# Patient Record
Sex: FEMALE | Race: WHITE | Employment: FULL TIME | URBAN - METROPOLITAN AREA
[De-identification: names, ages, dates, MRNs, and addresses within clinical notes are randomized per-mention and may not be internally consistent; named-entity substitution may affect disease eponyms.]

---

## 2017-02-22 ENCOUNTER — TELEPHONE (OUTPATIENT)
Dept: INTERNAL MEDICINE CLINIC | Facility: CLINIC | Age: 47
End: 2017-02-22

## 2017-02-22 NOTE — TELEPHONE ENCOUNTER
To Dr Nelda Pisano came today for 3rd Hep B injection  So far she has received injections 10/5/16 and 11/8/16    I checked with Dr Marivel Saini dose should be given 6 months from 1st so patient would have been too early as today's date is 2/22/17  Patient

## 2017-02-23 NOTE — TELEPHONE ENCOUNTER
Patient called back and was notified of 's message. Patient states that she already has scheduled an appointment to get the 3rd dose on 4/12/17, ok to keep that just cannot get the vaccine earlier than 4/5/17.  Patient verbalized understanding, state

## 2017-02-23 NOTE — TELEPHONE ENCOUNTER
She should get the thirst dose 4/5/17. She can always try going to a travel clinic for the 3rd dose (we can provide her documentation of her immunizations). Please ask her what she would like to do.

## 2017-03-15 ENCOUNTER — OFFICE VISIT (OUTPATIENT)
Dept: OTOLARYNGOLOGY | Facility: CLINIC | Age: 47
End: 2017-03-15

## 2017-03-15 VITALS
DIASTOLIC BLOOD PRESSURE: 83 MMHG | SYSTOLIC BLOOD PRESSURE: 139 MMHG | BODY MASS INDEX: 33.61 KG/M2 | WEIGHT: 178 LBS | TEMPERATURE: 98 F | HEIGHT: 61 IN

## 2017-03-15 DIAGNOSIS — J01.40 SUBACUTE PANSINUSITIS: Primary | ICD-10-CM

## 2017-03-15 PROCEDURE — 99212 OFFICE O/P EST SF 10 MIN: CPT | Performed by: OTOLARYNGOLOGY

## 2017-03-15 PROCEDURE — 99203 OFFICE O/P NEW LOW 30 MIN: CPT | Performed by: OTOLARYNGOLOGY

## 2017-03-15 RX ORDER — AZITHROMYCIN 250 MG/1
TABLET, FILM COATED ORAL
Qty: 1 PACKAGE | Refills: 0 | Status: SHIPPED | OUTPATIENT
Start: 2017-03-15

## 2017-03-15 RX ORDER — HYDROCHLOROTHIAZIDE 12.5 MG/1
CAPSULE, GELATIN COATED ORAL
COMMUNITY
Start: 2017-03-09

## 2017-03-15 NOTE — PROGRESS NOTES
Stacia Norwood is a 51year old female. Patient presents with:  Sinus Problem: sinus congestion, coughing, sore throat,facial pressure for 3 weeks      HISTORY OF PRESENT ILLNESS  3/15/2017  Patient prevents for evaluation of sinusitis.  This is not recur Negative Chest pain, irregular heartbeat/palpitations and syncope. GI Negative Abdominal pain and diarrhea. Endocrine Negative Cold intolerance and heat intolerance. Neuro Negative Tremors. Psych Negative Anxiety and depression.    Integumentary Neg (Patient taking differently: 1 spray by Nasal route daily as needed.  ), Disp: 1 Bottle, Rfl: 1  •  hydrochlorothiazide 12.5 MG Oral Tab, Take 1 tablet (12.5 mg total) by mouth daily. , Disp: 90 tablet, Rfl: 3  •  Sulfacetamide Sodium, Acne, 10 % Apply Exte

## 2017-04-10 ENCOUNTER — TELEPHONE (OUTPATIENT)
Dept: INTERNAL MEDICINE CLINIC | Facility: CLINIC | Age: 47
End: 2017-04-10

## 2017-04-10 NOTE — TELEPHONE ENCOUNTER
Pt. Has questions regarding the 3rd dose of Hep B she is waiting for her [employer] ins. She wants to know if the 3rd vaccine was billed already  Ph.  # [phone]   Routed to clinical

## 2017-06-26 ENCOUNTER — NURSE ONLY (OUTPATIENT)
Dept: INTERNAL MEDICINE CLINIC | Facility: CLINIC | Age: 47
End: 2017-06-26

## 2017-06-26 DIAGNOSIS — Z23 IMMUNIZATION DUE: Primary | ICD-10-CM

## 2017-06-26 PROCEDURE — 90471 IMMUNIZATION ADMIN: CPT | Performed by: INTERNAL MEDICINE

## 2017-06-26 PROCEDURE — 90746 HEPB VACCINE 3 DOSE ADULT IM: CPT | Performed by: INTERNAL MEDICINE

## 2017-06-26 NOTE — PROGRESS NOTES
Pt presents for 3rd Hepatitis B series. Full name and  verified. Hepatitis 1ml administered IM to left deltoid. Tolerated well.

## 2018-08-31 ENCOUNTER — TELEPHONE (OUTPATIENT)
Dept: ADMINISTRATIVE | Age: 48
End: 2018-08-31

## 2022-06-23 NOTE — TELEPHONE ENCOUNTER
From pt via fax -  Request for ov 06/26/17, faxed to [facility], scanned.  Emailed secured and mailed to pt, n/c. NK Picato Counseling:  I discussed with the patient the risks of Picato including but not limited to erythema, scaling, itching, weeping, crusting, and pain.

## 2022-09-29 NOTE — TELEPHONE ENCOUNTER
Pt states she currently has no insurance, unemployed. Pt is looking into [employer]. Pt had insurance until 3/31/17. Pt is supposed to schedule 3rd Hep B dose.  Pt is asking what this would be out of pocket and how long can she wait to get the 3rd Hep no chest pain and no edema.

## (undated) NOTE — MR AVS SNAPSHOT
[place]  [address]  [phone]               Thank you for choosing us for your health care visit with Adelfo Stone MD.  We are glad to serve you and happy to provide you with this summary 1 spray by Nasal route daily. What changed:    - when to take this  - reasons to take this           Sulfacetamide Sodium (Acne) 10 % Lotn   Apply  topically.            ZIANA 1.2-0.025 % Gel   Generic drug:  Clindamycin-Tretinoin   Apply  topically night increments are effective and add up over the week   2 ½ hours per week – spread out over time Use a obi to keep you motivated   Don’t forget strength training with weights and resistance Set goals and track your progress   You don’t need to join a gym.